# Patient Record
Sex: FEMALE | Race: WHITE | NOT HISPANIC OR LATINO | Employment: FULL TIME | URBAN - METROPOLITAN AREA
[De-identification: names, ages, dates, MRNs, and addresses within clinical notes are randomized per-mention and may not be internally consistent; named-entity substitution may affect disease eponyms.]

---

## 2018-01-17 NOTE — MISCELLANEOUS
Message   Recorded as Task   Date: 01/29/2016 02:13 PM, Created By: Raymond Hernandez   Task Name: Medical Complaint Callback   Assigned To: Reena Bragg   Regarding Patient: Adilene Siddiqi, Status: Active   Comment:    Reena Bragg - 29 Jan 2016 2:13 PM     TASK CREATED  Caller: Self; [phone] (Home); [phone] (Work)  Pt fell yesterday on ice - states her R ankle is swollen and her L wrist is swollen - does not think anything is broken  Is not sure if she currentlty has insurance or not - Wondering what she can do ? States she has been keeping it elevated and had ice on it   Would prefer not to come in due to not knowing status of insurance   States if you need her to come in she will  Please advise   [phone]  Geri Castillo - 29 Jan 2016 2:46 PM     TASK REPLIED TO: Previously Assigned To Geri Castillo  ice it and wrap it with ACE wrap; should have Xrays if any question of fracture - use Ibuprofen 200 mg OTC 2-3 tab PO q 6 hrs prn pain   Reena Bragg - 29 Jan 2016 3:14 PM     TASK REPLIED TO: Previously Assigned To Reena Bragg  Pt is aware  Would like to proceed with x-ray  Can you order? Also, is requesting a work note for today  Would also like to know if there is a prescription for crutches or something OTC to get some weight of her foot  States she works on her feet all day and can not put any weight on foot  Please advise   Ce Chambers - 29 Jan 2016 3:18 PM     TASK REPLIED TO: Previously Assigned To Geri Castillo  order for Xray put in [employer]scripts; rx for crutches written and note written off of work today and all given to Martina Wilks - 29 Jan 2016 3:23 PM     TASK REPLIED TO: Previously Assigned To Reena Bragg  Pt is aware  Active Problems    1  Acute pharyngitis, unspecified etiology (462) (J02 9)   2  Acute right ankle pain (719 47,338 19) (M25 571)   3  Arthralgia of left wrist (719 43) (M25 532)   4  Cervicalgia (723 1) (M54 2)   5  Depression with anxiety (300 4) (F41 8)   6  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

## 2018-01-18 NOTE — RESULT NOTES
Message   Please call pt and let her know throat culture was positive for Group B strep  I sent script for antibiotic to pharmacy  She should call if she does not improve  Verified Results  (1) THROAT CULTURE (CULTURE, UPPER RESPIRATORY) [id number] 12:00AM [facility]     Test Name Result Flag Reference   Upper Respiratory Culture Final report A    Result 1 Comment A    Beta hemolytic Streptococcus, group B  Scant growth  Penicillin and ampicillin are drugs of choice for treatment of  beta-hemolytic streptococcal infections  Susceptibility testing of  penicillins and other beta-lactam agents approved by the FDA for  treatment of beta-hemolytic streptococcal infections need not be  performed routinely because nonsusceptible isolates are extremely  rare in any beta-hemolytic streptococcus and have not been reported  for Streptococcus pyogenes (group A)   (CLSI 2011)       Plan  Streptococcus group B infection    · Levofloxacin 500 MG Oral Tablet; TAKE 1 TABLET DAILY AS DIRECTED

## 2019-10-16 PROCEDURE — 99282 EMERGENCY DEPT VISIT SF MDM: CPT

## 2019-10-17 ENCOUNTER — HOSPITAL ENCOUNTER (EMERGENCY)
Facility: HOSPITAL | Age: 34
Discharge: HOME | End: 2019-10-17
Attending: EMERGENCY MEDICINE | Admitting: EMERGENCY MEDICINE
Payer: COMMERCIAL

## 2019-10-17 VITALS
WEIGHT: 140 LBS | TEMPERATURE: 98.4 F | DIASTOLIC BLOOD PRESSURE: 84 MMHG | HEART RATE: 82 BPM | RESPIRATION RATE: 19 BRPM | OXYGEN SATURATION: 99 % | SYSTOLIC BLOOD PRESSURE: 146 MMHG | BODY MASS INDEX: 24.8 KG/M2

## 2019-10-17 DIAGNOSIS — K02.9 DENTAL CARIES: ICD-10-CM

## 2019-10-17 DIAGNOSIS — K08.89 PAIN, DENTAL: Primary | ICD-10-CM

## 2019-10-17 PROCEDURE — 99284 EMERGENCY DEPT VISIT MOD MDM: CPT | Performed by: EMERGENCY MEDICINE

## 2019-10-17 PROCEDURE — 64450 NJX AA&/STRD OTHER PN/BRANCH: CPT | Performed by: EMERGENCY MEDICINE

## 2019-10-17 RX ORDER — PENICILLIN V POTASSIUM 250 MG/1
500 TABLET ORAL ONCE
Status: COMPLETED | OUTPATIENT
Start: 2019-10-17 | End: 2019-10-17

## 2019-10-17 RX ORDER — PENICILLIN V POTASSIUM 500 MG/1
500 TABLET ORAL 4 TIMES DAILY
Qty: 28 TABLET | Refills: 0 | Status: SHIPPED | OUTPATIENT
Start: 2019-10-17 | End: 2019-10-24

## 2019-10-17 RX ORDER — CHLORHEXIDINE GLUCONATE 0.12 MG/ML
15 RINSE ORAL 2 TIMES DAILY
Qty: 120 ML | Refills: 0 | Status: SHIPPED | OUTPATIENT
Start: 2019-10-17

## 2019-10-17 RX ORDER — BUPIVACAINE HYDROCHLORIDE 5 MG/ML
10 INJECTION, SOLUTION EPIDURAL; INTRACAUDAL ONCE
Status: COMPLETED | OUTPATIENT
Start: 2019-10-17 | End: 2019-10-17

## 2019-10-17 RX ADMIN — PENICILLIN V POTASIUM 500 MG: 250 TABLET ORAL at 00:14

## 2019-10-17 RX ADMIN — BUPIVACAINE HYDROCHLORIDE 10 ML: 5 INJECTION, SOLUTION EPIDURAL; INTRACAUDAL; PERINEURAL at 00:13

## 2019-10-17 NOTE — ED PROVIDER NOTES
History  Chief Complaint   Patient presents with    Dental Pain     Left teeth hurt for about two days  29year old female presents for evaluation of left upper molar pain which has been worsening over the past 2 days  Pain is sharp and radiates to the left temporal region and left ear  Patient reports history of prior similar pain with loss of the filling of the left upper molar  She had been advised that she would need extraction of the tooth; however, she has not seen a dentist in the last 2 years  She has an appointment with a dentist 10/18/19  Patient states she has had subjective fevers  She has been taking ibuprofen for pain and fever at home  She does not have a thermometer at home, so was unable to measure her temperature  No swelling  She is tolerating a soft diet at home  History provided by:  Patient  Dental Pain   Location:  Upper  Upper teeth location:  16/RAVINDER 3rd molar  Quality:  Sharp  Severity:  Severe  Onset quality:  Gradual  Duration:  2 days  Timing:  Constant  Progression:  Waxing and waning  Chronicity:  New  Context: dental caries    Context: not trauma    Relieved by:  Nothing  Worsened by:  Nothing  Ineffective treatments:  NSAIDs  Associated symptoms: fever    Associated symptoms: no congestion, no headaches and no neck pain    Fever:     Duration:  2 days    Timing:  Sporadic    Temp source:  Subjective    Progression:  Waxing and waning  Risk factors: lack of dental care    Risk factors: no smoking (former smoker)        None       Past Medical History:   Diagnosis Date    No known health problems        Past Surgical History:   Procedure Laterality Date    NO PAST SURGERIES         No family history on file  I have reviewed and agree with the history as documented      Social History     Tobacco Use    Smoking status: Former Smoker    Smokeless tobacco: Never Used   Substance Use Topics    Alcohol use: Never     Frequency: Never    Drug use: Never        Review of Systems   Constitutional: Positive for fever  Negative for appetite change  HENT: Positive for dental problem  Negative for congestion, rhinorrhea and sore throat  Respiratory: Negative for cough, chest tightness and shortness of breath  Cardiovascular: Negative for chest pain, palpitations and leg swelling  Gastrointestinal: Negative for abdominal pain, constipation, diarrhea, nausea and vomiting  Genitourinary: Negative for dysuria, frequency and hematuria  Musculoskeletal: Negative for myalgias, neck pain and neck stiffness  Skin: Negative for pallor  Neurological: Negative for syncope, weakness and headaches  All other systems reviewed and are negative  Physical Exam  Physical Exam   Constitutional: She is oriented to person, place, and time  She appears well-developed and well-nourished  Non-toxic appearance  No distress  HENT:   Head: Normocephalic and atraumatic  Right Ear: Tympanic membrane normal    Left Ear: Tympanic membrane normal    Mouth/Throat: Uvula is midline  Dental caries present  Eyes: Pupils are equal, round, and reactive to light  Conjunctivae and EOM are normal    Neck: Normal range of motion  Neck supple  No tracheal deviation present  No thyromegaly present  Cardiovascular: Normal rate, regular rhythm, normal heart sounds and intact distal pulses  Pulmonary/Chest: Effort normal and breath sounds normal    Abdominal: Soft  Bowel sounds are normal  She exhibits no distension  There is no tenderness  Lymphadenopathy:     She has no cervical adenopathy  Neurological: She is alert and oriented to person, place, and time  Skin: Skin is warm and dry  She is not diaphoretic  Nursing note and vitals reviewed        Vital Signs  ED Triage Vitals [10/17/19 0006]   Temperature Pulse Respirations Blood Pressure SpO2   98 4 °F (36 9 °C) 85 14 146/84 98 %      Temp Source Heart Rate Source Patient Position - Orthostatic VS BP Location FiO2 (%)   Tympanic Monitor Lying Right arm --      Pain Score       9           Vitals:    10/17/19 0006 10/17/19 0015   BP: 146/84 146/84   Pulse: 85 82   Patient Position - Orthostatic VS: Lying Lying         Visual Acuity      ED Medications  Medications   bupivacaine (PF) (MARCAINE) 0 5 % injection 10 mL (10 mL Infiltration Given 10/17/19 0013)   penicillin V potassium (VEETID) tablet 500 mg (500 mg Oral Given 10/17/19 0014)       Diagnostic Studies  Results Reviewed     None                 No orders to display              Procedures  Nerve block  Date/Time: 10/17/2019 12:21 AM  Performed by: Francis Aquino MD  Authorized by: Francis Aquino MD     Patient location:  ED  Other Assisting Provider: Yes (comment)    Consent:     Consent obtained:  Verbal    Consent given by:  Patient    Risks discussed: Allergic reaction, intravenous injection, infection, bleeding, nerve damage, pain, unsuccessful block and swelling    Alternatives discussed:  Alternative treatment  Universal protocol:     Patient identity confirmed:  Verbally with patient and arm band  Indications:     Indications:  Pain relief  Location:     Body area:  Head    Head nerve blocked: superior alveolar  Nerve type:  Peripheral    Laterality:  Left  Procedure details (see MAR for exact dosages): Block needle gauge:  27 G    Anesthetic injected:  Bupivacaine 0 5% w/o epi    Steroid injected:  None    Additive injected:  None    Injection procedure:  Anatomic landmarks identified, anatomic landmarks palpated, incremental injection, introduced needle and negative aspiration for blood  Post-procedure details:     Dressing:  None    Outcome:  Pain relieved    Patient tolerance of procedure:   Tolerated well, no immediate complications           ED Course                               MDM  Number of Diagnoses or Management Options  Dental caries: new and requires workup  Pain, dental: new and requires workup  Diagnosis management comments: 29year old female presents with 2 days of worsening dental pain  Tooth 16 broken, not bleeding, tender to palpation  Dental block performed with improvement in pain  Penicillin for possible dental infection  Peridex for caries  Patient advised to call her dentist in the morning  Return precautions provided  Disposition  Final diagnoses:   Pain, dental   Dental caries     Time reflects when diagnosis was documented in both MDM as applicable and the Disposition within this note     Time User Action Codes Description Comment    10/17/2019 12:12 AM Jared Avelar Add [K08 89] Pain, dental     10/17/2019 12:12 AM Jared Avelar Add [K02 9] Dental caries       ED Disposition     ED Disposition Condition Date/Time Comment    Discharge Stable Thu Oct 17, 2019 12:12 AM Lavonne Cervantes discharge to home/self care  Follow-up Information     Follow up With Specialties Details Why Contact Info Additional Information    your dentist  Call today for further management      [address] [facility] Emergency Department Emergency Medicine Go to  If symptoms worsen, severe facial swelling, eye pain [address]  [address] [facility] [address] ED, [address], [facility]          Patient's Medications   Discharge Prescriptions    CHLORHEXIDINE (PERIDEX) 0 12 % SOLUTION    Apply 15 mL to the mouth or throat 2 (two) times a day       Start Date: 10/17/2019End Date: --       Order Dose: 15 mL       Quantity: 120 mL    Refills: 0    PENICILLIN V POTASSIUM (VEETID) 500 MG TABLET    Take 1 tablet (500 mg total) by mouth 4 (four) times a day for 7 days       Start Date: 10/17/2019End Date: 10/24/2019       Order Dose: 500 mg       Quantity: 28 tablet    Refills: 0     No discharge procedures on file      ED Provider  Electronically Signed by           Loco Zaragoza MD  10/17/19 5396

## 2019-10-17 NOTE — DISCHARGE INSTRUCTIONS
Toothache   WHAT YOU NEED TO KNOW:   A toothache is pain that is caused by irritation of the nerves in the center of your tooth  The irritation may be caused by several problems, such as a cavity, an infection, a cracked tooth, or gum disease  It is very important to follow up with your dentist so the cause of your toothache can be diagnosed and treated  This can help prevent more serious problems  DISCHARGE INSTRUCTIONS:   Medicines: You may  need any of the following:  · NSAIDs  decrease swelling and pain  This medicine can be bought with or without a doctor's order  This medicine can cause stomach bleeding or kidney problems in certain people  If you take blood thinner medicine, always ask your healthcare provider if NSAIDs are safe for you  Always read the medicine label and follow the directions on it before using this medicine  · Acetaminophen  decreases pain  It is available without a doctor's order  Ask how much to take and how often to take it  Follow directions  Acetaminophen can cause liver damage if not taken correctly  · Pain medicine  may be given as a pill or as medicine that you put directly on your tooth or gums  Do not wait until the pain is severe before you take this medicine  · Antibiotics  help fight or prevent an infection caused by bacteria  Take them as directed  · Take your medicine as directed  Contact your healthcare provider if you think your medicine is not helping or if you have side effects  Tell him of her if you are allergic to any medicine  Keep a list of the medicines, vitamins, and herbs you take  Include the amounts, and when and why you take them  Bring the list or the pill bottles to follow-up visits  Carry your medicine list with you in case of an emergency  Follow up with your dentist as directed: You may be referred to a dental surgeon  Write down your questions so you remember to ask them during your visits     Self-care:   · Rinse your mouth with warm salt water 4 times a day or as directed  · You may need to eat soft foods to help relieve pain caused by chewing  Contact your dentist if:   · You have questions or concerns about your condition or care  Return to the emergency department if:   · You have trouble breathing  · You have swelling in your face or neck  · You have a fever and chills  · You have trouble speaking or swallowing  · You have trouble opening or closing your mouth  © 2017 [employer] Information is for End User's use only and may not be sold, redistributed or otherwise used for commercial purposes  All illustrations and images included in CareNotes® are the copyrighted property of A D A M , Inc  or [employer] Oh  The above information is an  only  It is not intended as medical advice for individual conditions or treatments  Talk to your doctor, nurse or pharmacist before following any medical regimen to see if it is safe and effective for you

## 2019-10-21 ENCOUNTER — VBI (OUTPATIENT)
Dept: FAMILY MEDICINE CLINIC | Facility: HOSPITAL | Age: 34
End: 2019-10-21

## 2019-10-21 NOTE — TELEPHONE ENCOUNTER
10/21/2019 11:47 AM Phone (ApplyMap) René Abarca (Self) [phone] (M)   Not Available - Unable to leave a message due to mailbox being full  Unable to reach patient regarding recent ED visit on 10/16 for Pain, dental; Dental caries  Patient was discharged with medication (peridex, penicillin) and advised to follow up with dentist  2nd attempt will be made on 10/22      10/28/2019 01:45 PM Phone (ApplyMap) René Rima (Self) [phone] (M)  Not Available - Unable to leave a message due to mailbox being full  Unable to reach patient regarding recent ED visit on 10/16 for Pain, dental; Dental caries  Patient was discharged with medication (peridex, penicillin) and advised to follow up with dentist  Matt Hernández generated and mailed

## 2019-10-21 NOTE — LETTER
Eduar Louis MD  [address] [facility] [address]    Date: 10/28/19  Tessa Ramirez  [address]    Dear Mily Benjamin: Thank you for choosing [facility] emergency department for care  As your primary care provider we want to make sure that your ongoing medical care is being addressed  If you require follow up care as a result of your emergency department visit, there are a few things we would like you to know  As part of our continuing commitment to caring for our patient, we have added more same day appointments and have extended our office hours to meet your medical needs  After hours, on-call physicians are available via our main office line  We encourage you to contact our office prior to seeking treatment to discuss your symptoms with our medical staff  Together, we can determine the correct course of action  A majority of non-emergent conditions such as: common cold, flu-like symptoms, fevers, strains/sprains, dislocations, minor burns, cuts and animal bites can be treated at [address] [place] [address] facilities  Diagnostic testing is available at some sites  Of course, if you are experiencing a life threatening medical emergency call 911 or proceed directly to the nearest emergency room  Your nearest [address] [place] [address] facility is conveniently located at:    [address] [place] [address]  [address], [address] [place] [address] Road  [phone]      SKIP THE WAIT  Conveniently offered at most [facility] locations  [facility] your spot online at www [email or web address] or on the [place] [address]    Sincerely,    Eduar Louis MD  Dept: [phone]